# Patient Record
Sex: MALE | Race: WHITE | NOT HISPANIC OR LATINO | URBAN - METROPOLITAN AREA
[De-identification: names, ages, dates, MRNs, and addresses within clinical notes are randomized per-mention and may not be internally consistent; named-entity substitution may affect disease eponyms.]

---

## 2017-03-24 ENCOUNTER — IMPORTED ENCOUNTER (OUTPATIENT)
Dept: URBAN - METROPOLITAN AREA CLINIC 1 | Facility: CLINIC | Age: 67
End: 2017-03-24

## 2017-03-24 PROBLEM — H25.813: Noted: 2017-03-24

## 2017-03-24 PROBLEM — E11.9: Noted: 2017-03-24

## 2017-03-24 PROBLEM — Z79.84: Noted: 2017-03-24

## 2017-03-24 PROBLEM — D31.31: Noted: 2017-03-24

## 2017-03-24 PROCEDURE — 92015 DETERMINE REFRACTIVE STATE: CPT

## 2017-03-24 PROCEDURE — 92014 COMPRE OPH EXAM EST PT 1/>: CPT

## 2017-03-24 NOTE — PATIENT DISCUSSION
1.  DM Type II without sign of diabetic retinopathy and no blot heme on dilated retinal examination today OU No Macular Edema: Stable. Discussed the pathophysiology of diabetes and its effect on the eye and risk of blindness. Stressed the importance of strong glucose control. Advised of importance of at least yearly dilated examinations but to contact us immediately for any problems or concerns. 2. Type II diabetes controlled by oral medications. 3.  Cataract OU: Observe for now without intervention. The patient was advised to contact us if any change or worsening of vision4. Choroidal Nevus OD: Appears Benign and stable. Observe for changes. 5. Return for an appointment for a 30/glare in 1 year with Dr. Valeria Cesar.

## 2017-11-27 ENCOUNTER — IMPORTED ENCOUNTER (OUTPATIENT)
Dept: URBAN - METROPOLITAN AREA CLINIC 1 | Facility: CLINIC | Age: 67
End: 2017-11-27

## 2017-11-27 PROBLEM — H25.813: Noted: 2017-11-27

## 2017-11-27 PROBLEM — Z79.84: Noted: 2017-11-27

## 2017-11-27 PROBLEM — H35.372: Noted: 2017-11-27

## 2017-11-27 PROBLEM — E11.9: Noted: 2017-11-27

## 2017-11-27 PROBLEM — D31.31: Noted: 2017-11-27

## 2017-11-27 PROCEDURE — 92015 DETERMINE REFRACTIVE STATE: CPT

## 2017-11-27 PROCEDURE — 92012 INTRM OPH EXAM EST PATIENT: CPT

## 2017-11-27 NOTE — PATIENT DISCUSSION
1.  Cataract OU:  Visually Significant secondary to glare discussed the risks benefits alternatives and limitations of cataract surgery. The patient stated a full understanding and a desire to proceed with the procedure. The patient will need to return for preop appointment with cataract measurements and to have any additional questions answered and start pre-operative eye drops as directed. Phaco PCL OD Then OS. (L Handed) 2. DM Type II (Oral Meds) without sign of diabetic retinopathy and no blot heme on dilated retinal examination today OU No Macular Edema:  Discussed the pathophysiology of diabetes and its effect on the eye and risk of blindness. Stressed the importance of strong glucose control. Advised of importance of at least yearly dilated examinations but to contact us immediately for any problems or concerns. 3. Choroidal Nevus OD: Appears Benign and stable. Observe for changes. 4. Epiretinal Membrane OS - Observe for change. Return for an appointment with Dr. Claudeen Cobble for AS/HP.

## 2017-11-30 ENCOUNTER — IMPORTED ENCOUNTER (OUTPATIENT)
Dept: URBAN - METROPOLITAN AREA CLINIC 1 | Facility: CLINIC | Age: 67
End: 2017-11-30

## 2017-11-30 PROBLEM — H25.811: Noted: 2017-11-30

## 2017-11-30 PROCEDURE — 92136 OPHTHALMIC BIOMETRY: CPT

## 2017-11-30 NOTE — PATIENT DISCUSSION
Cataract OD: Visually Significant secondary to glare discussed the risks benefits alternatives and limitations of cataract surgery. The patient stated a full understanding and a desire to proceed with the procedure. The patient will need to return for preop appointment with cataract measurements and to have any additional questions answered and start pre-operative eye drops as directed. Proceed w/ phaco PCL OD w/ LenSxPt understands they will need glasses post-op to achieve their best corrected vision. Return for an appointment for sx OD with Dr. Jennifer Harrison.

## 2017-12-06 ENCOUNTER — IMPORTED ENCOUNTER (OUTPATIENT)
Dept: URBAN - METROPOLITAN AREA CLINIC 1 | Facility: CLINIC | Age: 67
End: 2017-12-06

## 2017-12-07 ENCOUNTER — IMPORTED ENCOUNTER (OUTPATIENT)
Dept: URBAN - METROPOLITAN AREA CLINIC 1 | Facility: CLINIC | Age: 67
End: 2017-12-07

## 2017-12-07 PROBLEM — Z96.1: Noted: 2017-12-07

## 2017-12-07 PROCEDURE — 99024 POSTOP FOLLOW-UP VISIT: CPT

## 2017-12-07 NOTE — PATIENT DISCUSSION
POD#1 CE/IOL OD (Standard w/ LenSx) doing well. Continue all 3 gtts as prescribed and until gone. Use Durezol BID OD Ilevro Qdaily OD Ocuflox TID OD Use ATs QID OU Routinely.  Post op Warnings Reiterated RTC as scheduled

## 2017-12-12 ENCOUNTER — IMPORTED ENCOUNTER (OUTPATIENT)
Dept: URBAN - METROPOLITAN AREA CLINIC 1 | Facility: CLINIC | Age: 67
End: 2017-12-12

## 2017-12-12 PROBLEM — Z96.1: Noted: 2017-12-12

## 2017-12-12 PROBLEM — H25.812: Noted: 2017-12-12

## 2017-12-12 PROCEDURE — 92136 OPHTHALMIC BIOMETRY: CPT

## 2017-12-12 NOTE — PATIENT DISCUSSION
1.  Cataract OS: Visually Significant secondary to glare discussed the risks benefits alternatives and limitations of cataract surgery. The patient stated a full understanding and a desire to proceed with the procedure. The patient will need to start pre-operative eye drops as directed. Proceed w/ phaco PCL OS Pt understands they will need glasses post-op to achieve their best corrected vision. 2.  POW#1  CE/IOL Standard w/ Lensx OD doing well. Discontinue OcufloxContinue Durezol BID until gone. Continue Ilevro QD until gone. 3. Return for an appointment for sx OS with Dr. Crystal Bowman.

## 2017-12-20 ENCOUNTER — IMPORTED ENCOUNTER (OUTPATIENT)
Dept: URBAN - METROPOLITAN AREA CLINIC 1 | Facility: CLINIC | Age: 67
End: 2017-12-20

## 2017-12-21 ENCOUNTER — IMPORTED ENCOUNTER (OUTPATIENT)
Dept: URBAN - METROPOLITAN AREA CLINIC 1 | Facility: CLINIC | Age: 67
End: 2017-12-21

## 2017-12-21 PROBLEM — Z96.1: Noted: 2017-12-21

## 2017-12-21 PROCEDURE — 99024 POSTOP FOLLOW-UP VISIT: CPT

## 2017-12-21 NOTE — PATIENT DISCUSSION
1. POD#1 CE/IOL OS (Standard w/ LenSx) doing well. Continue all 3 gtts as prescribed and until gone. Use Durezol BID OS Ilevro Qdaily OS Ocuflox TID OS 2. POW#2  CE/IOL OD (Standard w/ LenSx) doing well.   Use Durezol BID OS till out Use Ilevro Qdaily OS till out F/u as scheduled Considering computer specs after Phaco

## 2018-01-11 ENCOUNTER — IMPORTED ENCOUNTER (OUTPATIENT)
Dept: URBAN - METROPOLITAN AREA CLINIC 1 | Facility: CLINIC | Age: 68
End: 2018-01-11

## 2018-01-11 PROBLEM — Z96.1: Noted: 2018-01-11

## 2018-01-11 PROCEDURE — 99024 POSTOP FOLLOW-UP VISIT: CPT

## 2018-01-11 NOTE — PATIENT DISCUSSION
POW#3 Phaco/ PCL OU (Standard w/ LenSx) doing well Finish PO meds per schedule MRX for glasses given Return for an appointment in 4 mo 30 with Dr. Julia Ricardo.

## 2018-05-11 ENCOUNTER — IMPORTED ENCOUNTER (OUTPATIENT)
Dept: URBAN - METROPOLITAN AREA CLINIC 1 | Facility: CLINIC | Age: 68
End: 2018-05-11

## 2018-05-11 PROBLEM — Z79.84: Noted: 2018-05-11

## 2018-05-11 PROBLEM — Z96.1: Noted: 2018-05-11

## 2018-05-11 PROBLEM — H35.372: Noted: 2018-05-11

## 2018-05-11 PROBLEM — E11.9: Noted: 2018-05-11

## 2018-05-11 PROBLEM — D31.31: Noted: 2018-05-11

## 2018-05-11 PROCEDURE — 92014 COMPRE OPH EXAM EST PT 1/>: CPT

## 2018-05-11 NOTE — PATIENT DISCUSSION
1.  DM Type II (Oral Meds) without sign of diabetic retinopathy and no blot heme on dilated retinal examination today OU No Macular Edema:  Discussed the pathophysiology of diabetes and its effect on the eye and risk of blindness. Stressed the importance of strong glucose control. Advised of importance of at least yearly dilated examinations but to contact us immediately for any problems or concerns. 2. Choroidal Nevus OD: Appears Benign and stable. Observe for changes. 3. Pseudophakia OU - (Standard OU) H/o LenSx OU 4.  Epiretinal Membrane OS - Observe for change. Letter to PCP Return for an appointment in 1 yr 27 with Dr. Anatoliy Pierre.

## 2018-05-15 NOTE — PATIENT DISCUSSION
Recommended observation. Patient reports recent exam with GP. BP was a little high but observation was recommended.

## 2019-05-13 ENCOUNTER — IMPORTED ENCOUNTER (OUTPATIENT)
Dept: URBAN - METROPOLITAN AREA CLINIC 1 | Facility: CLINIC | Age: 69
End: 2019-05-13

## 2019-05-13 PROBLEM — H26.493: Noted: 2019-05-13

## 2019-05-13 PROBLEM — R73.09: Noted: 2019-05-13

## 2019-05-13 PROCEDURE — 92014 COMPRE OPH EXAM EST PT 1/>: CPT

## 2019-05-13 NOTE — PATIENT DISCUSSION
1.  DM Type II (Diet Controlled) without sign of diabetic retinopathy and no blot heme on dilated retinal examination today OU No Macular Edema:  Discussed the pathophysiology of diabetes and its effect on the eye and risk of blindness. Stressed the importance of strong glucose control. Advised of importance of at least yearly dilated examinations but to contact us immediately for any problems or concerns. 2. PCO OU: (Posterior Capsule Opacification)   Observe  3. Choroidal Nevus OD: Appears Benign and stable. Observe for changes. 4. Pseudophakia OU - (Standard OU) H/o LenSx OU 5. Epiretinal Membrane OS - Observe for change. Letter to PCP MRx deferred Return for an appointment in 6 mo 10 dfe glare with Dr. Fransisca Flores.

## 2019-11-15 ENCOUNTER — IMPORTED ENCOUNTER (OUTPATIENT)
Dept: URBAN - METROPOLITAN AREA CLINIC 1 | Facility: CLINIC | Age: 69
End: 2019-11-15

## 2019-11-15 PROBLEM — H26.493: Noted: 2019-11-15

## 2019-11-15 PROBLEM — D31.31: Noted: 2019-11-15

## 2019-11-15 PROCEDURE — 92012 INTRM OPH EXAM EST PATIENT: CPT

## 2019-11-15 NOTE — PATIENT DISCUSSION
1.  Choroidal Nevus OD: Appears Benign and stable. Observe for changes. 2. PCO OU- Consider YAG once patient becomes symptomatic. 3.  DM Type II (Diet Controlled) without sign of diabetic retinopathy and no blot heme on dilated retinal examination today OU No Macular Edema:  Discussed the pathophysiology of diabetes and its effect on the eye and risk of blindness. Stressed the importance of strong glucose control. Advised of importance of at least yearly dilated examinations but to contact us immediately for any problems or concerns. 4. Pseudophakia OU - (Standard OU) H/o LenSx OU 5. Epiretinal Membrane OS - Stable Observe for change. Return for an appointment in 6 mo 30 with Dr. Anatoliy Pierre.

## 2019-12-19 ENCOUNTER — IMPORTED ENCOUNTER (OUTPATIENT)
Dept: URBAN - METROPOLITAN AREA CLINIC 1 | Facility: CLINIC | Age: 69
End: 2019-12-19

## 2019-12-19 PROCEDURE — 92015 DETERMINE REFRACTIVE STATE: CPT

## 2021-05-10 ENCOUNTER — IMPORTED ENCOUNTER (OUTPATIENT)
Dept: URBAN - METROPOLITAN AREA CLINIC 1 | Facility: CLINIC | Age: 71
End: 2021-05-10

## 2021-05-10 PROBLEM — E11.9: Noted: 2021-05-10

## 2021-05-10 PROBLEM — H01.001: Noted: 2021-05-10

## 2021-05-10 PROBLEM — Z79.84: Noted: 2021-05-10

## 2021-05-10 PROBLEM — H01.004: Noted: 2021-05-10

## 2021-05-10 PROBLEM — H04.123: Noted: 2021-05-10

## 2021-05-10 PROBLEM — H16.143: Noted: 2021-05-10

## 2021-05-10 PROBLEM — D31.31: Noted: 2021-05-10

## 2021-05-10 PROCEDURE — 99214 OFFICE O/P EST MOD 30 MIN: CPT

## 2021-05-10 NOTE — PATIENT DISCUSSION
1.  DM Type II (Diet Controlled) without sign of diabetic retinopathy and no blot heme on dilated retinal examination today OU No Macular Edema:  Discussed the pathophysiology of diabetes and its effect on the eye and risk of blindness. Stressed the importance of strong glucose control. Advised of importance of at least yearly dilated examinations but to contact us immediately for any problems or concerns. 2. Choroidal Nevus OD: Appears Benign and stable. Observe for changes. 3. ANKUSH w/ PEK OU - Recommend ATs TID OU routinely. 4.  Anterior Blepharitis OU - Daily Hot compresses and lid scrubs were recommended. 5. PCO OU - Observe. Consider YAG once patient becomes symptomatic. 6. Pseudophakia OU - (Standard OU) H/o LenSx OU 7. Epiretinal Membrane OS - Stable Observe for change. Patient deferred Manifest Rx today. Return for an appointment in 1 year 30/glare with Dr. Claudeen Cobble.

## 2021-05-10 NOTE — PATIENT DISCUSSION
1.  DM Type II (Oral Meds) without sign of diabetic retinopathy and no blot heme on dilated retinal examination today OU No Macular Edema:  Discussed the pathophysiology of diabetes and its effect on the eye and risk of blindness. Stressed the importance of strong glucose control. Advised of importance of at least yearly dilated examinations but to contact us immediately for any problems or concerns. 2. Choroidal Nevus OD: Appears Benign and stable. Observe for changes. 3. ANKUSH w/ PEK OU - Recommend ATs TID OU routinely. 4.  Anterior Blepharitis OU - Daily Hot compresses and lid scrubs were recommended. 5. PCO OU - Observe. Consider YAG once patient becomes symptomatic. 6. Pseudophakia OU - (Standard OU) H/o LenSx OU 7. Epiretinal Membrane OS - Stable Observe for change. Patient deferred Manifest Rx today. Return for an appointment in 1 year 30/glare with Dr. Gigi Heck.

## 2021-09-30 ENCOUNTER — IMPORTED ENCOUNTER (OUTPATIENT)
Dept: URBAN - METROPOLITAN AREA CLINIC 1 | Facility: CLINIC | Age: 71
End: 2021-09-30

## 2021-09-30 PROBLEM — H43.812: Noted: 2021-09-30

## 2021-09-30 PROCEDURE — 99213 OFFICE O/P EST LOW 20 MIN: CPT

## 2021-09-30 NOTE — PATIENT DISCUSSION
1.  PVD w/o Tear OS - Patient was cautioned to call our office immediately if they experience a substantial change in their symptoms such as an increase in floaters persistent flashes loss of visual field (may appear as a shadow or a curtain) or decrease in visual acuity as these may indicate a retinal tear or detachment. 2.  DM Type II (Oral Medication) without sign of diabetic retinopathy and no blot heme on dilated retinal examination today OU No Macular Edema:  Discussed the pathophysiology of diabetes and its effect on the eye and risk of blindness. Stressed the importance of strong glucose control. Advised of importance of at least yearly dilated examinations but to contact us immediately for any problems or concerns. 3. Choroidal Nevus OD: Appears Benign and stable. Observe for changes. 4. ANKUSH w/ PEK OU - Recommend ATs TID OU routinely. 5.  Anterior Blepharitis OU - Daily Hot compresses and lid scrubs were recommended. 6. PCO OU - Observe. Consider YAG once patient becomes symptomatic. 7. Pseudophakia OU - (Standard OU) H/o LenSx OUReturn for an appointment in 4-5 weeks DFE OS only (PVD recheck) with Dr. Kylee Macdonald.

## 2021-09-30 NOTE — PATIENT DISCUSSION
DM Type II (Oral Meds) without sign of diabetic retinopathy and no blot heme on dilated retinal examination today OU No Macular Edema:  Discussed the pathophysiology of diabetes and its effect on the eye and risk of blindness. Stressed the importance of strong glucose control. Advised of importance of at least yearly dilated examinations but to contact us immediately for any problems or concerns. 3. Choroidal Nevus OD: Appears Benign and stable. Observe for changes. 4. ANKUSH w/ PEK OU - Recommend ATs TID OU routinely. 5.  Anterior Blepharitis OU - Daily Hot compresses and lid scrubs were recommended. 6. PCO OU - Observe. Consider YAG once patient becomes symptomatic. 7.   Pseudophakia OU - (Standard OU) H/o LenSx OU

## 2021-11-08 ENCOUNTER — IMPORTED ENCOUNTER (OUTPATIENT)
Dept: URBAN - METROPOLITAN AREA CLINIC 1 | Facility: CLINIC | Age: 71
End: 2021-11-08

## 2021-11-08 PROBLEM — H43.812: Noted: 2021-11-08

## 2021-11-08 PROCEDURE — 99213 OFFICE O/P EST LOW 20 MIN: CPT

## 2021-11-08 NOTE — PATIENT DISCUSSION
1.  PVD w/o Tear OS- Stable no holes or tears. RD precautions. 2.  DM Type II (Oral Medication) without sign of diabetic retinopathy and no blot heme on dilated retinal examination today OU No Macular Edema:  Discussed the pathophysiology of diabetes and its effect on the eye and risk of blindness. Stressed the importance of strong glucose control. Advised of importance of at least yearly dilated examinations but to contact us immediately for any problems or concerns. 3. Choroidal Nevus OD: Appears Benign and stable. Observe for changes. 4. ANKUSH w/ PEK OU - Recommend ATs TID OU routinely. 5.  Anterior Blepharitis OU - Daily Hot compresses and lid scrubs were recommended. 6. PCO OU - Observe. Consider YAG once patient becomes symptomatic. 7. Pseudophakia OU - (Standard OU) H/o LenSx OUReturn for an appointment for Return as scheduled with Dr. Ciera Teague.

## 2021-11-08 NOTE — PATIENT DISCUSSION
DM Type II (Oral Medication) without sign of diabetic retinopathy and no blot heme on dilated retinal examination today OU No Macular Edema:  Discussed the pathophysiology of diabetes and its effect on the eye and risk of blindness. Stressed the importance of strong glucose control. Advised of importance of at least yearly dilated examinations but to contact us immediately for any problems or concerns. 3. Choroidal Nevus OD: Appears Benign and stable. Observe for changes. 4. ANKUSH w/ PEK OU - Recommend ATs TID OU routinely. 5.  Anterior Blepharitis OU - Daily Hot compresses and lid scrubs were recommended. 6. PCO OU - Observe. Consider YAG once patient becomes symptomatic. 7. Pseudophakia OU - (Standard OU) H/o LenSx OUReturn for an appointment for Return as scheduled with Dr. Gigi Heck.

## 2021-12-15 ENCOUNTER — IMPORTED ENCOUNTER (OUTPATIENT)
Dept: URBAN - METROPOLITAN AREA CLINIC 1 | Facility: CLINIC | Age: 71
End: 2021-12-15

## 2021-12-15 PROBLEM — H52.222: Noted: 2021-12-15

## 2021-12-15 PROBLEM — H52.4: Noted: 2021-12-15

## 2021-12-15 PROBLEM — H52.13: Noted: 2021-12-15

## 2021-12-15 PROCEDURE — S0621 ROUTINE OPHTHALMOLOGICAL EXA: HCPCS

## 2021-12-15 NOTE — PATIENT DISCUSSION
1. Myopia w/ Astigmatism OS -- Rx was given for correction if indicated and requested. 2. Presbyopia 3. ANKUSH w/ PEK OU -- Recommend ATs TID OU routinely. 4.  Anterior Blepharitis OU -- Daily Hot compresses and lid scrubs were recommended. 5. PCO OU -- Observe. Consider YAG once patient becomes symptomatic. 6. Pseudophakia OU (Standard OU) -- H/o LenSx OU7. Epiretinal Membrane OS -- Stable. Observe. 8.  PVD w/o Tear OS -- Stable. RD precautions. 9.  Choroidal Nevus OD -- Appears Benign and stable. Observe for changes. 10. H/o DM Type II (Oral Medication) w/o DR OU Return for an appointment as scheduled with Dr. Sabrina Mckeon. Return for an appointment in 1 year 36 with Dr. Georgie Tinsley.

## 2022-04-02 ASSESSMENT — VISUAL ACUITY
OD_GLARE: 20/40
OS_GLARE: 20/60
OS_CC: 20/20
OS_GLARE: 20/60
OD_CC: 20/40-2
OD_CC: 20/40
OS_SC: J2
OD_CC: 20/20
OS_CC: 20/50
OD_SC: J2
OD_CC: 20/20
OD_CC: 20/30
OD_CC: J1
OD_CC: 20/20
OS_CC: 20/20
OS_CC: 20/20
OD_GLARE: 20/60
OS_CC: J1+
OD_CC: J1+
OD_CC: 20/25
OS_GLARE: 20/40
OS_CC: J1
OD_CC: 20/25
OS_GLARE: 20/40
OS_CC: 20/50
OS_CC: 20/20+1
OS_CC: 20/20
OD_CC: J1
OD_CC: 20/20
OD_CC: 20/20
OS_CC: 20/30
OD_CC: 20/20-1
OS_CC: 20/20
OD_CC: 20/20
OS_CC: J1
OD_CC: 20/20
OD_GLARE: 20/40
OS_CC: 20/20
OS_CC: 20/20
OD_CC: 20/30+1
OS_CC: 20/20
OD_GLARE: 20/60

## 2022-04-02 ASSESSMENT — TONOMETRY
OS_IOP_MMHG: 12
OS_IOP_MMHG: 13
OD_IOP_MMHG: 15
OD_IOP_MMHG: 13
OD_IOP_MMHG: 17
OS_IOP_MMHG: 15
OS_IOP_MMHG: 12
OD_IOP_MMHG: 15
OD_IOP_MMHG: 14
OD_IOP_MMHG: 13
OD_IOP_MMHG: 13
OS_IOP_MMHG: 11
OD_IOP_MMHG: 12
OS_IOP_MMHG: 17
OD_IOP_MMHG: 14
OD_IOP_MMHG: 12
OS_IOP_MMHG: 17
OS_IOP_MMHG: 13
OS_IOP_MMHG: 12
OS_IOP_MMHG: 13
OD_IOP_MMHG: 13
OS_IOP_MMHG: 13

## 2022-04-02 ASSESSMENT — KERATOMETRY
OD_AXISANGLE2_DEGREES: 072
OD_K2POWER_DIOPTERS: 43.00
OS_K1POWER_DIOPTERS: 42.50
OS_K2POWER_DIOPTERS: 43.25
OD_AXISANGLE_DEGREES: 162
OS_AXISANGLE2_DEGREES: 087
OD_K1POWER_DIOPTERS: 42.25
OS_AXISANGLE_DEGREES: 177

## 2022-05-10 ENCOUNTER — ESTABLISHED PATIENT (OUTPATIENT)
Dept: URBAN - METROPOLITAN AREA CLINIC 1 | Facility: CLINIC | Age: 72
End: 2022-05-10

## 2022-05-10 DIAGNOSIS — Z96.1: ICD-10-CM

## 2022-05-10 DIAGNOSIS — H26.493: ICD-10-CM

## 2022-05-10 DIAGNOSIS — E11.9: ICD-10-CM

## 2022-05-10 DIAGNOSIS — H43.812: ICD-10-CM

## 2022-05-10 DIAGNOSIS — H04.123: ICD-10-CM

## 2022-05-10 DIAGNOSIS — H01.021: ICD-10-CM

## 2022-05-10 DIAGNOSIS — D31.31: ICD-10-CM

## 2022-05-10 DIAGNOSIS — H01.024: ICD-10-CM

## 2022-05-10 DIAGNOSIS — H35.371: ICD-10-CM

## 2022-05-10 DIAGNOSIS — H16.143: ICD-10-CM

## 2022-05-10 PROCEDURE — 92014 COMPRE OPH EXAM EST PT 1/>: CPT

## 2022-05-10 ASSESSMENT — VISUAL ACUITY
OS_SC: J2
OS_BAT: 20/40
OD_SC: 20/20-1
OD_BAT: 20/70
OS_SC: 20/20
OD_SC: J2

## 2022-05-10 ASSESSMENT — TONOMETRY
OS_IOP_MMHG: 13
OD_IOP_MMHG: 13

## 2022-11-10 ENCOUNTER — FOLLOW UP (OUTPATIENT)
Dept: URBAN - METROPOLITAN AREA CLINIC 1 | Facility: CLINIC | Age: 72
End: 2022-11-10

## 2022-11-10 DIAGNOSIS — H35.371: ICD-10-CM

## 2022-11-10 DIAGNOSIS — H26.493: ICD-10-CM

## 2022-11-10 DIAGNOSIS — E11.9: ICD-10-CM

## 2022-11-10 PROCEDURE — 92012 INTRM OPH EXAM EST PATIENT: CPT

## 2022-11-10 PROCEDURE — 92134 CPTRZ OPH DX IMG PST SGM RTA: CPT

## 2022-11-10 ASSESSMENT — VISUAL ACUITY
OS_SC: 20/30
OD_SC: 20/40
OD_BAT: 20/80
OS_BAT: 20/60

## 2022-11-10 ASSESSMENT — TONOMETRY
OD_IOP_MMHG: 12
OS_IOP_MMHG: 12

## 2022-11-10 NOTE — PATIENT DISCUSSION
Visually Significant secondary to glare; schedule YAG Cap. Pros, cons, risks and benefits discussed with patient. Patient wishes to proceed. OS then OD.

## 2022-12-16 ENCOUNTER — CLINIC PROCEDURE ONLY (OUTPATIENT)
Dept: URBAN - METROPOLITAN AREA CLINIC 1 | Facility: CLINIC | Age: 72
End: 2022-12-16

## 2022-12-16 PROCEDURE — 66821 AFTER CATARACT LASER SURGERY: CPT

## 2022-12-16 NOTE — PROCEDURE NOTE: CLINICAL
PROCEDURE NOTE: YAG Capsulotomy OS. Diagnosis: Posterior Capsular Opacity. Anesthesia: Topical. The purpose and nature of the procedure, possible alternative methods of treatment, the risks involved and the possibility of complications were discussed with patient. The Patient wishes to proceed and the consent was signed. 1 gtt Prolensa applied. The laser was then performed under topical anesthesia with no complications. Post op instructions were given to patient as well as a follow-up appointment. Patient was advised to call our office if any questions or concerns. April Davison

## 2023-01-06 ENCOUNTER — CLINIC PROCEDURE ONLY (OUTPATIENT)
Dept: URBAN - METROPOLITAN AREA CLINIC 1 | Facility: CLINIC | Age: 73
End: 2023-01-06

## 2023-01-06 DIAGNOSIS — Z96.1: ICD-10-CM

## 2023-01-06 DIAGNOSIS — H26.491: ICD-10-CM

## 2023-01-06 PROCEDURE — 66821 AFTER CATARACT LASER SURGERY: CPT

## 2023-01-06 NOTE — PROCEDURE NOTE: CLINICAL
PROCEDURE NOTE: YAG Capsulotomy OD. Diagnosis: Posterior Capsular Opacity. Anesthesia: Topical. The purpose and nature of the procedure, possible alternative methods of treatment, the risks involved and the possibility of complications were discussed with patient. The Patient wishes to proceed and the consent was signed. 1 gtt Prolensa applied. The laser was then performed under topical anesthesia with no complications. Post op instructions were given to patient as well as a follow-up appointment. Patient was advised to call our office if any questions or concerns. Briana Jacobs

## 2023-07-13 ENCOUNTER — COMPREHENSIVE EXAM (OUTPATIENT)
Dept: URBAN - METROPOLITAN AREA CLINIC 1 | Facility: CLINIC | Age: 73
End: 2023-07-13

## 2023-07-13 DIAGNOSIS — Z96.1: ICD-10-CM

## 2023-07-13 DIAGNOSIS — H04.123: ICD-10-CM

## 2023-07-13 DIAGNOSIS — D31.31: ICD-10-CM

## 2023-07-13 DIAGNOSIS — E11.9: ICD-10-CM

## 2023-07-13 DIAGNOSIS — H43.812: ICD-10-CM

## 2023-07-13 DIAGNOSIS — H35.371: ICD-10-CM

## 2023-07-13 DIAGNOSIS — H16.143: ICD-10-CM

## 2023-07-13 PROCEDURE — 92015 DETERMINE REFRACTIVE STATE: CPT

## 2023-07-13 PROCEDURE — 92014 COMPRE OPH EXAM EST PT 1/>: CPT

## 2023-07-13 ASSESSMENT — TONOMETRY
OD_IOP_MMHG: 11
OS_IOP_MMHG: 11

## 2023-07-13 ASSESSMENT — VISUAL ACUITY
OD_SC: J2
OS_CC: J1+
OS_SC: J2
OD_CC: J1+
OD_SC: 20/20-1
OS_SC: 20/20

## 2024-07-29 ENCOUNTER — COMPREHENSIVE EXAM (OUTPATIENT)
Dept: URBAN - METROPOLITAN AREA CLINIC 1 | Facility: CLINIC | Age: 74
End: 2024-07-29

## 2024-07-29 DIAGNOSIS — H04.123: ICD-10-CM

## 2024-07-29 DIAGNOSIS — E11.9: ICD-10-CM

## 2024-07-29 DIAGNOSIS — H35.371: ICD-10-CM

## 2024-07-29 DIAGNOSIS — D31.31: ICD-10-CM

## 2024-07-29 DIAGNOSIS — H01.021: ICD-10-CM

## 2024-07-29 DIAGNOSIS — H01.024: ICD-10-CM

## 2024-07-29 DIAGNOSIS — H43.812: ICD-10-CM

## 2024-07-29 DIAGNOSIS — H16.143: ICD-10-CM

## 2024-07-29 PROCEDURE — 99214 OFFICE O/P EST MOD 30 MIN: CPT

## 2024-07-29 ASSESSMENT — TONOMETRY
OD_IOP_MMHG: 10
OS_IOP_MMHG: 11

## 2024-07-29 ASSESSMENT — VISUAL ACUITY
OS_SC: 20/20
OD_CC: J1
OS_CC: J1
OD_SC: 20/20

## 2024-12-18 ENCOUNTER — COMPREHENSIVE EXAM (OUTPATIENT)
Age: 74
End: 2024-12-18

## 2024-12-18 DIAGNOSIS — H52.13: ICD-10-CM

## 2024-12-18 DIAGNOSIS — Z01.00: ICD-10-CM

## 2024-12-18 DIAGNOSIS — H52.4: ICD-10-CM

## 2024-12-18 PROCEDURE — 92015 DETERMINE REFRACTIVE STATE: CPT

## 2024-12-18 PROCEDURE — 92014 COMPRE OPH EXAM EST PT 1/>: CPT

## 2025-07-25 ENCOUNTER — COMPREHENSIVE EXAM (OUTPATIENT)
Age: 75
End: 2025-07-25

## 2025-07-25 DIAGNOSIS — E11.9: ICD-10-CM

## 2025-07-25 DIAGNOSIS — Z96.1: ICD-10-CM

## 2025-07-25 DIAGNOSIS — H35.371: ICD-10-CM

## 2025-07-25 DIAGNOSIS — H04.123: ICD-10-CM

## 2025-07-25 DIAGNOSIS — D31.31: ICD-10-CM

## 2025-07-25 DIAGNOSIS — H01.021: ICD-10-CM

## 2025-07-25 DIAGNOSIS — H01.024: ICD-10-CM

## 2025-07-25 DIAGNOSIS — H16.143: ICD-10-CM

## 2025-07-25 DIAGNOSIS — H43.812: ICD-10-CM

## 2025-07-25 PROCEDURE — 99214 OFFICE O/P EST MOD 30 MIN: CPT
